# Patient Record
Sex: FEMALE | Race: WHITE | NOT HISPANIC OR LATINO | Employment: FULL TIME | URBAN - METROPOLITAN AREA
[De-identification: names, ages, dates, MRNs, and addresses within clinical notes are randomized per-mention and may not be internally consistent; named-entity substitution may affect disease eponyms.]

---

## 2023-01-13 ENCOUNTER — OFFICE VISIT (OUTPATIENT)
Dept: CARDIOLOGY | Facility: CLINIC | Age: 53
End: 2023-01-13
Payer: COMMERCIAL

## 2023-01-13 VITALS
HEIGHT: 62 IN | DIASTOLIC BLOOD PRESSURE: 80 MMHG | BODY MASS INDEX: 36.8 KG/M2 | SYSTOLIC BLOOD PRESSURE: 130 MMHG | WEIGHT: 200 LBS | HEART RATE: 76 BPM

## 2023-01-13 DIAGNOSIS — R06.09 DYSPNEA ON EXERTION: Primary | ICD-10-CM

## 2023-01-13 DIAGNOSIS — Z95.1 S/P CABG (CORONARY ARTERY BYPASS GRAFT): ICD-10-CM

## 2023-01-13 DIAGNOSIS — E78.2 HYPERLIPEMIA, MIXED: ICD-10-CM

## 2023-01-13 DIAGNOSIS — I25.10 CORONARY ARTERY DISEASE INVOLVING NATIVE CORONARY ARTERY OF NATIVE HEART WITHOUT ANGINA PECTORIS: ICD-10-CM

## 2023-01-13 PROCEDURE — 99204 OFFICE O/P NEW MOD 45 MIN: CPT | Performed by: INTERNAL MEDICINE

## 2023-01-13 PROCEDURE — 93000 ELECTROCARDIOGRAM COMPLETE: CPT | Performed by: INTERNAL MEDICINE

## 2023-01-13 RX ORDER — MELATONIN
4 DAILY
COMMUNITY

## 2023-01-13 RX ORDER — GLYBURIDE 2.5 MG/1
TABLET ORAL DAILY
COMMUNITY
Start: 2023-01-12

## 2023-01-13 RX ORDER — UBIDECARENONE 200 MG
200 TABLET ORAL DAILY
COMMUNITY

## 2023-01-13 RX ORDER — FUROSEMIDE 40 MG/1
TABLET ORAL DAILY
COMMUNITY
Start: 2022-12-19

## 2023-01-13 RX ORDER — VENLAFAXINE 37.5 MG/1
TABLET ORAL DAILY
COMMUNITY
Start: 2022-11-03

## 2023-01-13 RX ORDER — ATORVASTATIN CALCIUM 40 MG/1
TABLET, FILM COATED ORAL DAILY
COMMUNITY
Start: 2022-12-01

## 2023-01-13 RX ORDER — CARVEDILOL 6.25 MG/1
6.25 TABLET ORAL 2 TIMES DAILY
COMMUNITY
Start: 2022-12-03 | End: 2023-02-07 | Stop reason: SDUPTHER

## 2023-01-13 RX ORDER — CLOPIDOGREL BISULFATE 75 MG/1
TABLET ORAL DAILY
COMMUNITY
Start: 2023-01-12

## 2023-02-06 ENCOUNTER — TELEPHONE (OUTPATIENT)
Dept: CARDIOLOGY | Facility: CLINIC | Age: 53
End: 2023-02-06
Payer: COMMERCIAL

## 2023-02-06 NOTE — TELEPHONE ENCOUNTER
----- Message from Demi Dunn sent at 2/6/2023 10:30 AM EST -----    ----- Message -----  From: Isa Lira RN  Sent: 2/1/2023   3:27 PM EST  To: Demi Dunn      ----- Message -----  From: TRACY Hare MD  Sent: 2/1/2023   3:16 PM EST  To: Isa Lira RN    Echo reviewed  Heart function was normal  Valve function was stable, she has a calcified mitral valve but there is no significant leaking or narrowing    Follow-up as scheduled

## 2023-02-07 RX ORDER — CARVEDILOL 6.25 MG/1
6.25 TABLET ORAL 2 TIMES DAILY
Qty: 180 TABLET | Refills: 3 | Status: SHIPPED | OUTPATIENT
Start: 2023-02-07

## 2023-08-16 ENCOUNTER — OFFICE VISIT (OUTPATIENT)
Dept: CARDIOLOGY | Facility: CLINIC | Age: 53
End: 2023-08-16
Payer: COMMERCIAL

## 2023-08-16 VITALS
SYSTOLIC BLOOD PRESSURE: 144 MMHG | WEIGHT: 203 LBS | BODY MASS INDEX: 37.36 KG/M2 | HEART RATE: 82 BPM | DIASTOLIC BLOOD PRESSURE: 87 MMHG | HEIGHT: 62 IN

## 2023-08-16 DIAGNOSIS — R06.09 DYSPNEA ON EXERTION: ICD-10-CM

## 2023-08-16 DIAGNOSIS — E78.2 HYPERLIPEMIA, MIXED: ICD-10-CM

## 2023-08-16 DIAGNOSIS — Z95.1 S/P CABG (CORONARY ARTERY BYPASS GRAFT): ICD-10-CM

## 2023-08-16 DIAGNOSIS — I25.10 CORONARY ARTERY DISEASE INVOLVING NATIVE CORONARY ARTERY OF NATIVE HEART WITHOUT ANGINA PECTORIS: Primary | ICD-10-CM

## 2023-08-16 RX ORDER — POTASSIUM CHLORIDE 1500 MG/1
20 TABLET, EXTENDED RELEASE ORAL DAILY
COMMUNITY
Start: 2023-07-17

## 2023-08-16 RX ORDER — SEMAGLUTIDE 0.68 MG/ML
0.25 INJECTION, SOLUTION SUBCUTANEOUS WEEKLY
COMMUNITY
Start: 2023-07-14

## 2023-08-16 RX ORDER — ASPIRIN 81 MG/1
81 TABLET ORAL DAILY
COMMUNITY

## 2023-08-16 NOTE — PROGRESS NOTES
Chief Complaint  Shortness of Breath    Subjective            Marlenmary beth Guillen presents to [facility] CARDIOLOGY  History of Present Illness    Ms. Olguin is here today for follow-up evaluation management of coronary artery disease with previous CABG, mitral valve annuloplasty in 2018, essential hypertension, mixed hyperlipidemia.  Since her last office visit she is doing very well.  She has no cardiac complaints.  She denies chest pain, excessive shortness of breath, or palpitations.  She has been more active and exercising.  Medical therapy.    PMH  Past Medical History:   Diagnosis Date    Abnormal ECG     CHF (congestive heart failure)     COPD (chronic obstructive pulmonary disease)     Diabetes mellitus     Hyperlipidemia     Myocardial infarction     Sleep apnea          SURGICALHX  Past Surgical History:   Procedure Laterality Date    CARDIAC DEFIBRILLATOR PLACEMENT      Was taken back out later    CORONARY ARTERY BYPASS GRAFT          SOC  Social History     Socioeconomic History    Marital status: Single   Tobacco Use    Smoking status: Former     Types: Cigarettes    Smokeless tobacco: Never   Vaping Use    Vaping Use: Former   Substance and Sexual Activity    Alcohol use: Not Currently    Drug use: Never    Sexual activity: Defer         FAMHX  Family History   Problem Relation Age of Onset    Heart attack Mother     No Known Problems Father     Heart attack Maternal Grandfather           ALLERGY  Allergies   Allergen Reactions    Bee Venom Unknown - Low Severity    Omeprazole Swelling     Face swelling        MEDSCURRENT    Current Outpatient Medications:     aspirin 81 MG EC tablet, Take 1 tablet by mouth Daily., Disp: , Rfl:     atorvastatin (LIPITOR) 40 MG tablet, Daily., Disp: , Rfl:     carvedilol (COREG) 6.25 MG tablet, Take 1 tablet by mouth 2 (Two) Times a Day., Disp: 180 tablet, Rfl: 3    cholecalciferol (VITAMIN D3) 25 MCG (1000 UT) tablet, Take 4 Units by mouth Daily., Disp: ,  "Rfl:     clopidogrel (PLAVIX) 75 MG tablet, Daily., Disp: , Rfl:     Coenzyme Q10 200 MG tablet, Take 200 mg by mouth Daily., Disp: , Rfl:     furosemide (LASIX) 40 MG tablet, Daily. 1 1/2 tablets QD, Disp: , Rfl:     glyburide (DIAbeta) 2.5 MG tablet, Daily., Disp: , Rfl:     metFORMIN (GLUCOPHAGE) 1000 MG tablet, 2 (Two) Times a Day., Disp: , Rfl:     Ozempic, 0.25 or 0.5 MG/DOSE, 2 MG/3ML solution pen-injector, Inject 0.25 mg under the skin into the appropriate area as directed 1 (One) Time Per Week., Disp: , Rfl:     potassium chloride ER (K-TAB) 20 MEQ tablet controlled-release ER tablet, Take 1 tablet by mouth Daily., Disp: , Rfl:     venlafaxine (EFFEXOR) 37.5 MG tablet, Daily., Disp: , Rfl:       Review of Systems   Constitutional: Negative for malaise/fatigue.   Cardiovascular:  Negative for chest pain, dyspnea on exertion, irregular heartbeat, leg swelling and palpitations.   Respiratory:  Negative for shortness of breath.    Neurological:  Negative for dizziness.      Objective     /87   Pulse 82   Ht 157.5 cm (62\")   Wt 92.1 kg (203 lb)   BMI 37.13 kg/mý       General Appearance:   well developed  well nourished  HENT:   oropharynx moist  lips not cyanotic  Neck:  thyroid not enlarged  supple  Respiratory:  no respiratory distress  normal breath sounds  no rales  Cardiovascular:  no jugular venous distention  regular rhythm  apical impulse normal  S1 normal, S2 normal  no S3, no S4   no murmur  no rub, no thrill  carotid pulses normal; no bruit  pedal pulses normal  lower extremity edema: none    Musculoskeletal:  no clubbing of fingers.   normocephalic, head atraumatic  Skin:   warm, dry  Psychiatric:  judgement and insight appropriate  normal mood and affect      Result Review :     The following data was reviewed by: JANICE Kennedy on 08/16/2023:              Data reviewed : Cardiology studies recent echocardiogram showed ejection fraction 51 to 55%, grade 1 diastolic " dysfunction, and calcified mitral valve with no significant stenosis or regurgitation.      Procedures      Marlen Guillen  reports that she has quit smoking. Her smoking use included cigarettes. She has never used smokeless tobacco.. I have educated her on the risk of diseases from using tobacco products such as cancer, COPD, and heart disease.                   Assessment and Plan        ASSESSMENT:  Encounter Diagnoses   Name Primary?    Coronary artery disease involving native coronary artery of native heart without angina pectoris Yes    S/P CABG (coronary artery bypass graft)     Dyspnea on exertion     Hyperlipemia, mixed          PLAN:    1.  Coronary artery disease-with previous CABG in 2018.  Clinically stable, no angina.  Continue current medical therapy.  2.  Essential hypertension-mildly elevated upon arrival.  This came down to normal when rechecked.  Continue current medical therapy.  3.  Mixed hyperlipidemia-I do not have a recent lipid panel for review.  She just had blood work drawn at her primary care office today.  I have requested her lipid panel.  Goal LDL less than 70.  Continue statin therapy.    Follow-up in 6 months unless problems arise.      Patient was given instructions and counseling regarding her condition or for health maintenance advice. Please see specific information pulled into the AVS if appropriate.           Renee Lopez, APRN   8/16/2023  11:13 EDT

## 2023-10-03 ENCOUNTER — TELEPHONE (OUTPATIENT)
Dept: CARDIOLOGY | Facility: CLINIC | Age: 53
End: 2023-10-03
Payer: COMMERCIAL

## 2023-10-03 NOTE — TELEPHONE ENCOUNTER
Patient is moderate, stable cardiovascular risk for endoscopy.     May hold aspirin and Plavix for 5 days prior.     No additional testing necessary.

## 2023-10-03 NOTE — TELEPHONE ENCOUNTER
Procedure: Colonoscopy and/or EGD     Med Directive:  Aspirin, Plavix     PMH: CAD CABG mitral valve annuloplasty 2018, HTN, HLD     Last Seen: 8/16/23

## 2024-02-20 ENCOUNTER — OFFICE VISIT (OUTPATIENT)
Dept: CARDIOLOGY | Facility: CLINIC | Age: 54
End: 2024-02-20
Payer: COMMERCIAL

## 2024-02-20 VITALS
BODY MASS INDEX: 37.73 KG/M2 | WEIGHT: 205 LBS | HEART RATE: 65 BPM | HEIGHT: 62 IN | DIASTOLIC BLOOD PRESSURE: 84 MMHG | SYSTOLIC BLOOD PRESSURE: 126 MMHG

## 2024-02-20 DIAGNOSIS — Z95.1 S/P CABG (CORONARY ARTERY BYPASS GRAFT): Primary | ICD-10-CM

## 2024-02-20 DIAGNOSIS — E78.2 HYPERLIPEMIA, MIXED: ICD-10-CM

## 2024-02-20 DIAGNOSIS — I25.10 CORONARY ARTERY DISEASE INVOLVING NATIVE CORONARY ARTERY OF NATIVE HEART WITHOUT ANGINA PECTORIS: ICD-10-CM

## 2024-02-20 NOTE — PROGRESS NOTES
Chief Complaint  Shortness of Breath, Coronary Artery Disease, and S/P CABG (coronary artery bypass graft)    Subjective            Marlen BRINA Guillen presents to [facility] CARDIOLOGY      Ms. Olguin is here today for follow-up evaluation management of coronary artery disease with previous CABG, mitral valve annuloplasty in 2018, essential hypertension, mixed hyperlipidemia.  Overall she is doing well.  She denies chest pain, palpitations or excessive shortness of breath.  She is compliant with her medical therapy.    PMH  Past Medical History:   Diagnosis Date    Abnormal ECG     CHF (congestive heart failure)     COPD (chronic obstructive pulmonary disease)     Diabetes mellitus     Hyperlipidemia     Myocardial infarction     Sleep apnea          SURGICALHX  Past Surgical History:   Procedure Laterality Date    CARDIAC DEFIBRILLATOR PLACEMENT      Was taken back out later    CORONARY ARTERY BYPASS GRAFT          SOC  Social History     Socioeconomic History    Marital status: Single   Tobacco Use    Smoking status: Former     Types: Cigarettes    Smokeless tobacco: Never   Vaping Use    Vaping Use: Former   Substance and Sexual Activity    Alcohol use: Not Currently    Drug use: Never    Sexual activity: Defer         FAMHX  Family History   Problem Relation Age of Onset    Heart attack Mother     No Known Problems Father     Heart attack Maternal Grandfather           ALLERGY  Allergies   Allergen Reactions    Bee Venom Unknown - Low Severity    Omeprazole Swelling     Face swelling        MEDSCURRENT    Current Outpatient Medications:     aspirin 81 MG EC tablet, Take 1 tablet by mouth Daily., Disp: , Rfl:     atorvastatin (LIPITOR) 40 MG tablet, Daily., Disp: , Rfl:     carvedilol (COREG) 6.25 MG tablet, Take 1 tablet by mouth 2 (Two) Times a Day., Disp: 180 tablet, Rfl: 3    cholecalciferol (VITAMIN D3) 25 MCG (1000 UT) tablet, Take 4 Units by mouth Daily., Disp: , Rfl:     clopidogrel (PLAVIX)  "75 MG tablet, Daily., Disp: , Rfl:     Coenzyme Q10 200 MG tablet, Take 200 mg by mouth Daily., Disp: , Rfl:     furosemide (LASIX) 40 MG tablet, Daily. 1 1/2 tablets QD, Disp: , Rfl:     metFORMIN (GLUCOPHAGE) 1000 MG tablet, 2 (Two) Times a Day., Disp: , Rfl:     Ozempic, 0.25 or 0.5 MG/DOSE, 2 MG/3ML solution pen-injector, Inject 0.25 mg under the skin into the appropriate area as directed 1 (One) Time Per Week., Disp: , Rfl:     potassium chloride ER (K-TAB) 20 MEQ tablet controlled-release ER tablet, Take 1 tablet by mouth Daily., Disp: , Rfl:     venlafaxine (EFFEXOR) 37.5 MG tablet, Daily., Disp: , Rfl:     glyburide (DIAbeta) 2.5 MG tablet, Daily. (Patient not taking: Reported on 2/20/2024), Disp: , Rfl:       Review of Systems   Constitutional: Negative for malaise/fatigue.   Cardiovascular:  Negative for chest pain, dyspnea on exertion, irregular heartbeat, leg swelling and palpitations.   Neurological:  Negative for dizziness.        Objective     /84   Pulse 65   Ht 157.5 cm (62\")   Wt 93 kg (205 lb)   BMI 37.49 kg/m²       General Appearance:   well developed  well nourished  HENT:   oropharynx moist  lips not cyanotic  Neck:  thyroid not enlarged  supple  Respiratory:  no respiratory distress  normal breath sounds  no rales  Cardiovascular:  no jugular venous distention  regular rhythm  apical impulse normal  S1 normal, S2 normal  no S3, no S4   no murmur  no rub, no thrill  carotid pulses normal; no bruit  pedal pulses normal  lower extremity edema: none    Musculoskeletal:  no clubbing of fingers.   normocephalic, head atraumatic  Skin:   warm, dry  Psychiatric:  judgement and insight appropriate  normal mood and affect      Result Review :     The following data was reviewed by: Alonzo Hare MD on 08/16/2023:              Data reviewed : Primary care records reviewed      Procedures                  Assessment and Plan        ASSESSMENT:  Encounter Diagnoses   Name Primary? "    S/P CABG (coronary artery bypass graft) Yes    Hyperlipemia, mixed     Coronary artery disease involving native coronary artery of native heart without angina pectoris          PLAN:    1.  Coronary artery disease-with previous CABG in 2018.  She is clinically stable, no angina, continue current medical therapy   2.  Essential hypertension-controlled, continue current medical therapy  3.  Mixed hyperlipidemia-continue statin therapy, review labs from primary care    Follow-up in 1 year      Patient was given instructions and counseling regarding her condition or for health maintenance advice. Please see specific information pulled into the AVS if appropriate.           Alonzo Hare MD   2/20/2024  15:18 EST

## 2024-08-26 ENCOUNTER — TELEPHONE (OUTPATIENT)
Dept: CARDIOLOGY | Facility: CLINIC | Age: 54
End: 2024-08-26
Payer: COMMERCIAL

## 2024-08-26 NOTE — TELEPHONE ENCOUNTER
Caller: Marlen Guillen     Relationship: PATIENT     Best call back number: [phone] (home)      What is your medical concern? LEFT ARM TINGLING FROM ELBOW TO FINGER TIPS AND NUMBNESS IN JUST FINGER TIPS. ALSO HAS TENSION IN LEFT SIDE OF NECK. ALL OFF AND ON NOT CONSTANT     How long has this issue been going on? LAST OF COUPLE OF WEEKS     Is your provider already aware of this issue? YES    Have you been treated for this issue? TAKES IBUPROFEN  AND HEAT AND ICE

## 2024-08-26 NOTE — TELEPHONE ENCOUNTER
PRIMITIVO patient. Patient states last 3 weeks she been having occasional L arm numbness tingling and burning pain. Emperatriztent going to see PCP today but also wanted to see cardio to make sure not her heart. Patient states can happen at random times. Apt made

## 2024-08-28 ENCOUNTER — OFFICE VISIT (OUTPATIENT)
Dept: CARDIOLOGY | Facility: CLINIC | Age: 54
End: 2024-08-28
Payer: COMMERCIAL

## 2024-08-28 VITALS
SYSTOLIC BLOOD PRESSURE: 127 MMHG | OXYGEN SATURATION: 94 % | WEIGHT: 201 LBS | DIASTOLIC BLOOD PRESSURE: 81 MMHG | BODY MASS INDEX: 36.99 KG/M2 | HEART RATE: 83 BPM | HEIGHT: 62 IN

## 2024-08-28 DIAGNOSIS — E78.2 HYPERLIPEMIA, MIXED: ICD-10-CM

## 2024-08-28 DIAGNOSIS — Z95.1 S/P CABG (CORONARY ARTERY BYPASS GRAFT): ICD-10-CM

## 2024-08-28 DIAGNOSIS — I34.9 NONRHEUMATIC MITRAL VALVE DISORDER: Primary | ICD-10-CM

## 2024-08-28 DIAGNOSIS — I25.10 CORONARY ARTERY DISEASE INVOLVING NATIVE CORONARY ARTERY OF NATIVE HEART WITHOUT ANGINA PECTORIS: ICD-10-CM

## 2024-08-28 PROCEDURE — 99214 OFFICE O/P EST MOD 30 MIN: CPT | Performed by: NURSE PRACTITIONER

## 2024-08-28 NOTE — PROGRESS NOTES
Chief Complaint  Coronary Artery Disease and Chest Pain    Subjective            Marlen BRINA Guillen presents to [facility] CARDIOLOGY  History of Present Illness    Ms. Olguin is here today for follow-up evaluation management of coronary artery disease with previous CABG in 2018, mitral valve annuloplasty 2018, essential hypertension, mixed hyperlipidemia.  She is here today for early follow-up due to new onset stiffness in the left side of her neck along with chest pressure that starts in the lower half of her chest and wraps up into the center.  The symptoms only last a few seconds and seem to be triggered by position changes.  Not specifically exertional.  Ongoing for about 2 weeks.  She denies excessive shortness of breath, dizziness, or syncope.    PMH  Past Medical History:   Diagnosis Date    Abnormal ECG     CHF (congestive heart failure)     COPD (chronic obstructive pulmonary disease)     Diabetes mellitus     Hyperlipidemia     Myocardial infarction     Sleep apnea          SURGICALHX  Past Surgical History:   Procedure Laterality Date    CARDIAC DEFIBRILLATOR PLACEMENT      Was taken back out later    CORONARY ARTERY BYPASS GRAFT          SOC  Social History     Socioeconomic History    Marital status: Single   Tobacco Use    Smoking status: Former     Types: Cigarettes    Smokeless tobacco: Never   Vaping Use    Vaping status: Former   Substance and Sexual Activity    Alcohol use: Not Currently    Drug use: Never    Sexual activity: Defer         FAMHX  Family History   Problem Relation Age of Onset    Heart attack Mother     No Known Problems Father     Heart attack Maternal Grandfather           ALLERGY  Allergies   Allergen Reactions    Bee Venom Unknown - Low Severity    Omeprazole Swelling     Face swelling        MEDSCURRENT    Current Outpatient Medications:     aspirin 81 MG EC tablet, Take 1 tablet by mouth Daily., Disp: , Rfl:     atorvastatin (LIPITOR) 40 MG tablet, Daily.,  "Disp: , Rfl:     carvedilol (COREG) 6.25 MG tablet, Take 1 tablet by mouth 2 (Two) Times a Day., Disp: 180 tablet, Rfl: 3    cholecalciferol (VITAMIN D3) 25 MCG (1000 UT) tablet, Take 4 Units by mouth Daily., Disp: , Rfl:     clopidogrel (PLAVIX) 75 MG tablet, Daily., Disp: , Rfl:     Coenzyme Q10 200 MG tablet, Take 200 mg by mouth Daily., Disp: , Rfl:     furosemide (LASIX) 40 MG tablet, Daily. 1 1/2 tablets QD, Disp: , Rfl:     metFORMIN (GLUCOPHAGE) 1000 MG tablet, 2 (Two) Times a Day., Disp: , Rfl:     Ozempic, 0.25 or 0.5 MG/DOSE, 2 MG/3ML solution pen-injector, Inject 0.25 mg under the skin into the appropriate area as directed 1 (One) Time Per Week., Disp: , Rfl:     potassium chloride ER (K-TAB) 20 MEQ tablet controlled-release ER tablet, Take 1 tablet by mouth Daily., Disp: , Rfl:     venlafaxine (EFFEXOR) 37.5 MG tablet, Daily., Disp: , Rfl:     glyburide (DIAbeta) 2.5 MG tablet, Daily. (Patient not taking: Reported on 2/20/2024), Disp: , Rfl:       Review of Systems   Cardiovascular:  Positive for chest pain. Negative for dyspnea on exertion, palpitations and syncope.   Musculoskeletal:  Positive for neck pain.        Objective     /81   Pulse 83   Ht 157.5 cm (62\")   Wt 91.2 kg (201 lb)   SpO2 94%   BMI 36.76 kg/m²       General Appearance:   well developed  well nourished  HENT:   oropharynx moist  lips not cyanotic  Neck:  thyroid not enlarged  supple  Respiratory:  no respiratory distress  normal breath sounds  no rales  Cardiovascular:  no jugular venous distention  regular rhythm  apical impulse normal  S1 normal, S2 normal  no S3, no S4   Soft systolic murmur  no rub, no thrill  carotid pulses normal; no bruit  pedal pulses normal  lower extremity edema: none    Musculoskeletal:  no clubbing of fingers.   normocephalic, head atraumatic  Skin:   warm, dry  Psychiatric:  judgement and insight appropriate  normal mood and affect      Result Review :     The following data was reviewed by: " Renee Lopez, APRN on 08/28/2024:              Data reviewed : Echocardiogram early last year showed low normal LV systolic function ejection fraction 51 to 55%, diastolic dysfunction, mitral annular calcification with no significant mitral valve dysfunction.    Procedures      Marlen Guillen  reports that she has quit smoking. Her smoking use included cigarettes. She has never used smokeless tobacco. I have educated her on the risk of diseases from using tobacco products such as cancer, COPD, and heart disease.            Assessment and Plan        ASSESSMENT:  Encounter Diagnoses   Name Primary?    Nonrheumatic mitral valve disorder Yes    Coronary artery disease involving native coronary artery of native heart without angina pectoris     S/P CABG (coronary artery bypass graft)     Hyperlipemia, mixed          PLAN:    1.  Coronary artery disease with previous CABG in 2018.  She is having intermittent neck stiffness and chest pressure which is positional and not specifically exertional.  Only lasting a few seconds.  Does not sound like typical angina but due to her coronary history I suggested stress imaging, however she prefers not to do a stress test unless she absolutely has to.  So we will take a watchful waiting approach on that.  Will update echocardiogram with new onset symptoms and history of mitral valve annuloplasty in 2018.  2.  Essential hypertension controlled, continue current medical therapy.  3.  Mixed hyperlipidemia stable on statin therapy, continue.    Will call with echo results once available.  If normal follow-up as scheduled.      Patient was given instructions and counseling regarding her condition or for health maintenance advice. Please see specific information pulled into the AVS if appropriate.           Renee Lopez, APRN   8/28/2024  15:04 EDT

## 2024-09-03 ENCOUNTER — TELEPHONE (OUTPATIENT)
Dept: CARDIOLOGY | Facility: CLINIC | Age: 54
End: 2024-09-03
Payer: COMMERCIAL

## 2024-09-03 NOTE — TELEPHONE ENCOUNTER
Sent patient a letter stating that 2-17-25 appointment has been rescheduled due to Hermila being off.

## 2024-09-18 ENCOUNTER — OUTSIDE FACILITY SERVICE (OUTPATIENT)
Dept: CARDIOLOGY | Facility: CLINIC | Age: 54
End: 2024-09-18
Payer: COMMERCIAL

## 2024-09-20 DIAGNOSIS — I34.9 NONRHEUMATIC MITRAL VALVE DISORDER: ICD-10-CM

## 2025-01-20 ENCOUNTER — OFFICE VISIT (OUTPATIENT)
Dept: CARDIOLOGY | Facility: CLINIC | Age: 55
End: 2025-01-20
Payer: COMMERCIAL

## 2025-01-20 VITALS
WEIGHT: 198.6 LBS | DIASTOLIC BLOOD PRESSURE: 68 MMHG | SYSTOLIC BLOOD PRESSURE: 102 MMHG | HEIGHT: 62 IN | HEART RATE: 83 BPM | OXYGEN SATURATION: 95 % | BODY MASS INDEX: 36.55 KG/M2

## 2025-01-20 DIAGNOSIS — I34.9 NONRHEUMATIC MITRAL VALVE DISORDER: Primary | ICD-10-CM

## 2025-01-20 DIAGNOSIS — Z95.1 S/P CABG (CORONARY ARTERY BYPASS GRAFT): ICD-10-CM

## 2025-01-20 DIAGNOSIS — I25.10 CORONARY ARTERY DISEASE INVOLVING NATIVE CORONARY ARTERY OF NATIVE HEART WITHOUT ANGINA PECTORIS: ICD-10-CM

## 2025-01-20 DIAGNOSIS — E78.2 HYPERLIPEMIA, MIXED: ICD-10-CM

## 2025-01-20 PROCEDURE — 99214 OFFICE O/P EST MOD 30 MIN: CPT | Performed by: NURSE PRACTITIONER

## 2025-01-20 NOTE — PROGRESS NOTES
Chief Complaint  nonrheumatic mitral valve disorder and Follow-up (1 year)    Subjective            Marlenmary beth Guillen presents to [facility] CARDIOLOGY  History of Present Illness    Ms. Olguin is here today for follow-up evaluation management of coronary artery disease with previous CABG in 2018, mitral valve annuloplasty 2018, essential hypertension, mixed hyperlipidemia.  Since her last visit she is doing very well.  She has had no further chest pain.  Denies excessive shortness of breath or palpitations.  Compliant with medical therapy.    PMH  Past Medical History:   Diagnosis Date    Abnormal ECG     CHF (congestive heart failure)     COPD (chronic obstructive pulmonary disease)     Diabetes mellitus     Hyperlipidemia     Myocardial infarction     Sleep apnea          SURGICALHX  Past Surgical History:   Procedure Laterality Date    CARDIAC DEFIBRILLATOR PLACEMENT      Was taken back out later    CORONARY ARTERY BYPASS GRAFT          SOC  Social History     Socioeconomic History    Marital status: Single   Tobacco Use    Smoking status: Former     Types: Cigarettes    Smokeless tobacco: Never   Vaping Use    Vaping status: Former   Substance and Sexual Activity    Alcohol use: Not Currently    Drug use: Never    Sexual activity: Defer         FAMHX  Family History   Problem Relation Age of Onset    Heart attack Mother     No Known Problems Father     Heart attack Maternal Grandfather           ALLERGY  Allergies   Allergen Reactions    Bee Venom Unknown - Low Severity    Omeprazole Swelling     Face swelling        MEDSCURRENT    Current Outpatient Medications:     aspirin 81 MG EC tablet, Take 1 tablet by mouth Daily., Disp: , Rfl:     atorvastatin (LIPITOR) 40 MG tablet, Daily., Disp: , Rfl:     carvedilol (COREG) 6.25 MG tablet, Take 1 tablet by mouth 2 (Two) Times a Day., Disp: 180 tablet, Rfl: 3    cholecalciferol (VITAMIN D3) 25 MCG (1000 UT) tablet, Take 4 Units by mouth Daily., Disp: ,  "Rfl:     clopidogrel (PLAVIX) 75 MG tablet, Daily., Disp: , Rfl:     Coenzyme Q10 200 MG tablet, Take 200 mg by mouth Daily., Disp: , Rfl:     furosemide (LASIX) 40 MG tablet, Daily. 1 1/2 tablets QD, Disp: , Rfl:     metFORMIN (GLUCOPHAGE) 1000 MG tablet, 2 (Two) Times a Day., Disp: , Rfl:     Ozempic, 0.25 or 0.5 MG/DOSE, 2 MG/3ML solution pen-injector, Inject 0.25 mg under the skin into the appropriate area as directed 1 (One) Time Per Week., Disp: , Rfl:     potassium chloride ER (K-TAB) 20 MEQ tablet controlled-release ER tablet, Take 1 tablet by mouth Daily., Disp: , Rfl:     venlafaxine (EFFEXOR) 37.5 MG tablet, Daily., Disp: , Rfl:       Review of Systems   Cardiovascular:  Negative for chest pain, dyspnea on exertion, orthopnea and syncope.        Objective     /68   Pulse 83   Ht 157.5 cm (62\")   Wt 90.1 kg (198 lb 9.6 oz)   SpO2 95%   BMI 36.32 kg/m²       General Appearance:   well developed  well nourished  HENT:   oropharynx moist  lips not cyanotic  Neck:  thyroid not enlarged  supple  Respiratory:  no respiratory distress  normal breath sounds  no rales  Cardiovascular:  no jugular venous distention  regular rhythm  apical impulse normal  S1 normal, S2 normal  no S3, no S4   no murmur  no rub, no thrill  carotid pulses normal; no bruit  pedal pulses normal  lower extremity edema: none    Musculoskeletal:  no clubbing of fingers.   normocephalic, head atraumatic  Skin:   warm, dry  Psychiatric:  judgement and insight appropriate  normal mood and affect      Result Review :     The following data was reviewed by: JANICE Kennedy on 01/20/2025:              Data reviewed : Cardiology studies echocardiogram from last summer showed low normal LV systolic function, EF 50%.  Mitral annular calcification with mild mitral valve thickening, mild mitral regurgitation.    Procedures      Marlen BRINA Guillen  reports that she has quit smoking. Her smoking use included cigarettes. She has never " used smokeless tobacco. I have educated her on the risk of diseases from using tobacco products such as cancer, COPD, and heart disease.                Assessment and Plan        ASSESSMENT:  Encounter Diagnoses   Name Primary?    Nonrheumatic mitral valve disorder Yes    Coronary artery disease involving native coronary artery of native heart without angina pectoris     S/P CABG (coronary artery bypass graft)     Hyperlipemia, mixed          PLAN:    1.  Coronary artery disease with previous CABG in 2018.  Clinically stable without angina.  Continue current medical therapy.  2.  Mitral valve annuloplasty in 2018, stable valve function on recent echo.  3.  Essential hypertension controlled, continue current medical therapy.  4.  Mixed hyperlipidemia stable on statin therapy, continue.    Follow-up annually.      Patient was given instructions and counseling regarding her condition or for health maintenance advice. Please see specific information pulled into the AVS if appropriate.           Renee Lopez, APRN   1/20/2025  11:10 EST

## 2025-02-19 ENCOUNTER — HOSPITAL ENCOUNTER (OUTPATIENT)
Dept: OTHER | Facility: HOSPITAL | Age: 55
Discharge: HOME | End: 2025-02-19

## 2025-03-21 ENCOUNTER — OFFICE VISIT (OUTPATIENT)
Dept: NEUROSURGERY | Facility: CLINIC | Age: 55
End: 2025-03-21
Payer: COMMERCIAL

## 2025-03-21 VITALS
WEIGHT: 197 LBS | DIASTOLIC BLOOD PRESSURE: 45 MMHG | HEIGHT: 62 IN | BODY MASS INDEX: 36.25 KG/M2 | SYSTOLIC BLOOD PRESSURE: 95 MMHG | HEART RATE: 81 BPM

## 2025-03-21 DIAGNOSIS — M79.602 LEFT ARM PAIN: ICD-10-CM

## 2025-03-21 DIAGNOSIS — M48.02 FORAMINAL STENOSIS OF CERVICAL REGION: ICD-10-CM

## 2025-03-21 DIAGNOSIS — R20.0 NUMBNESS AND TINGLING OF LEFT UPPER EXTREMITY: ICD-10-CM

## 2025-03-21 DIAGNOSIS — R20.2 NUMBNESS AND TINGLING OF LEFT UPPER EXTREMITY: ICD-10-CM

## 2025-03-21 DIAGNOSIS — M50.223 HERNIATED NUCLEUS PULPOSUS, C6-7 LEFT: ICD-10-CM

## 2025-03-21 DIAGNOSIS — M54.2 CERVICALGIA: ICD-10-CM

## 2025-03-21 DIAGNOSIS — M50.221 HERNIATED NUCLEUS PULPOSUS, C4-5 LEFT: Primary | ICD-10-CM

## 2025-03-21 DIAGNOSIS — M50.222 HERNIATED NUCLEUS PULPOSUS, C5-6 LEFT: ICD-10-CM

## 2025-03-21 NOTE — PROGRESS NOTES
"Chief Complaint  Numbness (Left arm ), Tingling (Fingers on left hand ), Shoulder Pain (Left ), Arm Pain (Left ), and Neck Pain (At times )    Subjective          Marlen BRINA Guillen who is a 55 y.o. year old female who presents to [facility] for Evaluation of the Spine.     The patient complains of pain located in the Cervical Spine.  Patients states the pain has been present for 1 month.  The pain came on acutely.  The pain scaled level is 7.  The pain does radiate. Dermatomes are located on left Cervical at: a non-specific dermatome..  The pain is constant and waxing/waning and described as aching and burning.  The pain is worse at no particular time of day. Patient states nothing improves the pain.  Patient states nothing worsens the pain.    Associated Symptoms Include: Numbness and Tingling in left arm/fingers. Denies weakness.  Conservative Interventions Include: Muscle Relaxants that were somewhat effective. Steroid shot was somewhat effective.    Was this the result of an injury or accident? : No    History of Previous Spinal Surgery?: No     reports that she has quit smoking. Her smoking use included cigarettes. She has never used smokeless tobacco.    Review of Systems   Musculoskeletal:  Positive for neck stiffness.   Neurological:  Positive for numbness.        Objective   Vital Signs:   BP 95/45 (Patient Position: Sitting)   Pulse 81   Ht 157.5 cm (62\")   Wt 89.4 kg (197 lb)   BMI 36.03 kg/m²       Physical Exam  Constitutional:       Appearance: Normal appearance. She is obese.   Pulmonary:      Effort: Pulmonary effort is normal.   Musculoskeletal:         General: Tenderness (left cervical area) present.      Cervical back: Normal range of motion.      Comments: Ley's negative bilaterally   Neurological:      General: No focal deficit present.      Mental Status: She is alert and oriented to person, place, and time.      Sensory: No sensory deficit.      " Motor: No weakness.      Deep Tendon Reflexes: Reflexes normal.   Psychiatric:         Mood and Affect: Mood normal.         Behavior: Behavior normal.        Neurological Exam  Mental Status  Alert. Oriented to person, place, and time.      Result Review     I have personally interpreted the MRI of cervical spine without contrast from 2/19/2025 which shows disc bulging most significant at C4-C5, C5-C6 and C6-C7.  There is severe left foraminal stenosis at C4-C5, C5-C6 and C6-C7.     Assessment and Plan    Diagnoses and all orders for this visit:    1. Herniated nucleus pulposus, C4-5 left (Primary)  -     Ambulatory Referral to Pain Management    2. Herniated nucleus pulposus, C5-6 left  -     Ambulatory Referral to Pain Management    3. Herniated nucleus pulposus, C6-7 left  -     Ambulatory Referral to Pain Management    4. Foraminal stenosis of cervical region  -     Ambulatory Referral to Pain Management    5. Cervicalgia  -     Ambulatory Referral to Pain Management    6. Left arm pain  -     Ambulatory Referral to Pain Management    7. Numbness and tingling of left upper extremity  -     Ambulatory Referral to Pain Management    She has pain in the left arm to the forearm, numbness and tingling to the fingers.    She has severe left foraminal stenosis from C4-C7. She may consider an ACDF but this would likely involve all 3 levels. She would like to avoid this for now.    She may consider PT vs spinal injections.    I will refer her to pain management.    The patient was counseled on basic recommendations for the reduction and prevention of back, neck, or spine pain in association with spinal disorders, including: cessation/avoidance of nicotine use, maintenance of a healthy BMI and weight, focusing on building/maintaining core strength through core exercise, and avoidance of activities which worsen the pain. The patient will monitor for changes in symptoms and notify our clinic of these changes as  needed.    Follow Up   Return if symptoms worsen or fail to improve.  Patient was given instructions and counseling regarding her condition or for health maintenance advice. Please see specific information pulled into the AVS if appropriate.

## 2025-05-16 ENCOUNTER — TELEPHONE (OUTPATIENT)
Dept: CARDIOLOGY | Facility: CLINIC | Age: 55
End: 2025-05-16
Payer: COMMERCIAL

## 2025-05-16 NOTE — TELEPHONE ENCOUNTER
[facility] received a fax that requires your attention. The document has been indexed to the patient’s chart for your review.      Reason for sending: EXTERNAL MEDICAL RECORD NOTIFICATION     Documents Description: CARDIAC CLEARANCE REQ-[facility]-5.16.25    Name of Sender: [facility]    Date Indexed: 5.16.25

## 2025-05-16 NOTE — TELEPHONE ENCOUNTER
Procedure:Epidural Steroid Injection    Med Directive:Plavix (7 days pre, 24H post) and Aspirin (4 days pre and 24H post)    PMH: CAD post CABG, mitral valve disorder, HLD    Last Seen:01/20/2025

## 2025-06-06 NOTE — PROGRESS NOTES
Chief Complaint  Previous Bypass    Subjective            Marlen Guillen presents to [facility] CARDIOLOG[facility]  History of Present Illness    52-year-old white female.  She has known coronary artery disease with CABG 2018.  At that time she had ischemic cardiomyopathy, and apparently this was somewhat of a chronic issue, to the point where she received a primary prevention ICD in late 2018, but unfortunately that became infected and she was septic and it was removed in early 2019.  It was determined that her ejection fraction had improved and it was not reimplanted.  She has mixed hyperlipidemia.  She is changing cardiology providers.  She has mild mitral regurgitation by previous echo in 2021 with ejection fraction 50 to 55%.  She has dyspnea on exertion, occasional minor flutters occurring less than once a month.    PMH  Past Medical History:   Diagnosis Date   • Abnormal ECG    • CHF (congestive heart failure) ([facility])    • COPD (chronic obstructive pulmonary disease) ([facility])    • Diabetes mellitus ([facility])    • Hyperlipidemia    • Myocardial infarction ([facility])    • Sleep apnea          SURGICALHX  Past Surgical History:   Procedure Laterality Date   • CARDIAC DEFIBRILLATOR PLACEMENT      Was taken back out later   • CORONARY ARTERY BYPASS GRAFT          SOC  Social History     Socioeconomic History   • Marital status:    Tobacco Use   • Smoking status: Former     Types: Cigarettes   • Smokeless tobacco: Never   Vaping Use   • Vaping Use: Former   Substance and Sexual Activity   • Alcohol use: Not Currently   • Drug use: Never   • Sexual activity: Defer         FAMHX  Family History   Problem Relation Age of Onset   • Heart attack Mother    • No Known Problems Father    • Heart attack Maternal Grandfather           ALLERGY  Allergies   Allergen Reactions   • Bee Venom Unknown - Low Severity   • Omeprazole Swelling     Face swelling        MEDSCURRENT    Current Outpatient Medications:   •  atorvastatin  "(LIPITOR) 40 MG tablet, Daily., Disp: , Rfl:   •  carvedilol (COREG) 6.25 MG tablet, Take 6.25 mg by mouth 2 (Two) Times a Day., Disp: , Rfl:   •  cholecalciferol (VITAMIN D3) 25 MCG (1000 UT) tablet, Take 4 Units by mouth Daily., Disp: , Rfl:   •  clopidogrel (PLAVIX) 75 MG tablet, Daily., Disp: , Rfl:   •  Coenzyme Q10 200 MG tablet, Take 200 mg by mouth Daily., Disp: , Rfl:   •  furosemide (LASIX) 40 MG tablet, Daily. 1 1/2 tablets QD, Disp: , Rfl:   •  glyburide (DIAbeta) 2.5 MG tablet, Daily., Disp: , Rfl:   •  metFORMIN (GLUCOPHAGE) 1000 MG tablet, 2 (Two) Times a Day., Disp: , Rfl:   •  venlafaxine (EFFEXOR) 37.5 MG tablet, Daily., Disp: , Rfl:       Review of Systems   Constitutional: Negative.   HENT: Negative.    Eyes: Negative.    Cardiovascular: Positive for dyspnea on exertion and irregular heartbeat. Negative for chest pain.   Respiratory: Positive for shortness of breath.    Endocrine: Negative.    Hematologic/Lymphatic: Negative.    Skin: Negative.    Musculoskeletal: Negative.    Gastrointestinal: Negative.    Genitourinary: Negative.    Neurological: Negative.    Psychiatric/Behavioral: Negative.         Objective     /80   Pulse 76   Ht 157.5 cm (62\")   Wt 90.7 kg (200 lb)   BMI 36.58 kg/m²       General Appearance:   · well developed  · well nourished, obese  HENT:   · oropharynx moist  · lips not cyanotic  Neck:  · thyroid not enlarged  · supple  Respiratory:  · no respiratory distress  · normal breath sounds  · no rales  Cardiovascular:  · no jugular venous distention  · regular rhythm  · apical impulse normal  · S1 normal, S2 normal  · no S3, no S4   · no murmur  · no rub, no thrill  · carotid pulses normal; no bruit  · pedal pulses normal  · lower extremity edema: none    Musculoskeletal:  · no clubbing of fingers.   · normocephalic, head atraumatic  Skin:   · warm, dry  Psychiatric:  · judgement and insight appropriate  · normal mood and affect      Result Review :             Data " reviewed: Primary care records reviewed, laboratory studies reviewed, previous cardiology records reviewed       ECG 12 Lead    Date/Time: 1/13/2023 10:45 AM  Performed by: TRACY Hare MD  Authorized by: TRACY Hare MD   Comparison: not compared with previous ECG   Previous ECG: no previous ECG available  Rhythm: sinus rhythm  Conduction: conduction normal  ST Segments: ST segments normal  T Waves: T waves normal  QRS axis: normal  Other findings: left ventricular hypertrophy    Clinical impression: non-specific ECG                     Assessment and Plan        ASSESSMENT:  Encounter Diagnoses   Name Primary?   • Dyspnea on exertion Yes   • Coronary artery disease involving native coronary artery of native heart without angina pectoris    • Hyperlipemia, mixed    • S/P CABG (coronary artery bypass graft)          PLAN:    1.  Dyspnea on exertion-schedule echocardiogram to follow-up on ejection fraction and mitral regurgitation  2.  Coronary artery disease status post CABG-clinically stable, continue current medical therapy  3.  Mixed hyperlipidemia-stable, continue statin therapy    We will discuss diagnostic results when available otherwise the patient will be seen for a routine visit in 6 months          Patient was given instructions and counseling regarding her condition or for health maintenance advice. Please see specific information pulled into the AVS if appropriate.             TRACY Hare MD  1/13/2023    10:44 EST   flank pain

## 2025-07-10 ENCOUNTER — TELEPHONE (OUTPATIENT)
Dept: CARDIOLOGY | Facility: CLINIC | Age: 55
End: 2025-07-10
Payer: COMMERCIAL

## 2025-07-10 NOTE — TELEPHONE ENCOUNTER
[facility] received a fax that requires your attention. The document has been indexed to the patient’s chart for your review.      Reason for sending: EXTERNAL MEDICAL RECORD NOTIFICATION     Documents Description: CARDIAC CLEARANCE REQ-[facility]-7.10.25    Name of Sender: [facility]     Date Indexed: 7.10.25
